# Patient Record
Sex: FEMALE | Race: WHITE | Employment: OTHER | URBAN - NONMETROPOLITAN AREA
[De-identification: names, ages, dates, MRNs, and addresses within clinical notes are randomized per-mention and may not be internally consistent; named-entity substitution may affect disease eponyms.]

---

## 2023-07-29 ENCOUNTER — HOSPITAL ENCOUNTER (EMERGENCY)
Age: 78
Discharge: HOME | End: 2023-07-29
Attending: EMERGENCY MEDICINE
Payer: COMMERCIAL

## 2023-07-29 ENCOUNTER — APPOINTMENT (OUTPATIENT)
Dept: GENERAL RADIOLOGY | Age: 78
End: 2023-07-29
Payer: COMMERCIAL

## 2023-07-29 VITALS
BODY MASS INDEX: 27.99 KG/M2 | HEIGHT: 65 IN | RESPIRATION RATE: 18 BRPM | WEIGHT: 168 LBS | TEMPERATURE: 100.8 F | DIASTOLIC BLOOD PRESSURE: 69 MMHG | SYSTOLIC BLOOD PRESSURE: 126 MMHG | OXYGEN SATURATION: 93 % | HEART RATE: 62 BPM

## 2023-07-29 DIAGNOSIS — U07.1 COVID-19: Primary | ICD-10-CM

## 2023-07-29 DIAGNOSIS — R51.9 ACUTE NONINTRACTABLE HEADACHE, UNSPECIFIED HEADACHE TYPE: ICD-10-CM

## 2023-07-29 LAB — SARS-COV-2 RDRP RESP QL NAA+PROBE: DETECTED

## 2023-07-29 PROCEDURE — 99284 EMERGENCY DEPT VISIT MOD MDM: CPT

## 2023-07-29 PROCEDURE — 6370000000 HC RX 637 (ALT 250 FOR IP): Performed by: EMERGENCY MEDICINE

## 2023-07-29 PROCEDURE — 87635 SARS-COV-2 COVID-19 AMP PRB: CPT

## 2023-07-29 PROCEDURE — 71045 X-RAY EXAM CHEST 1 VIEW: CPT

## 2023-07-29 RX ORDER — ACETAMINOPHEN 325 MG/1
650 TABLET ORAL ONCE
Status: COMPLETED | OUTPATIENT
Start: 2023-07-29 | End: 2023-07-29

## 2023-07-29 RX ORDER — FUROSEMIDE 20 MG/1
20 TABLET ORAL 2 TIMES DAILY
COMMUNITY

## 2023-07-29 RX ORDER — TRAMADOL HYDROCHLORIDE 50 MG/1
50 TABLET ORAL EVERY 6 HOURS PRN
Qty: 12 TABLET | Refills: 0 | Status: SHIPPED | OUTPATIENT
Start: 2023-07-29 | End: 2023-08-01

## 2023-07-29 RX ORDER — TRAMADOL HYDROCHLORIDE 50 MG/1
50 TABLET ORAL ONCE
Status: COMPLETED | OUTPATIENT
Start: 2023-07-29 | End: 2023-07-29

## 2023-07-29 RX ORDER — LOSARTAN POTASSIUM 50 MG/1
50 TABLET ORAL DAILY
COMMUNITY

## 2023-07-29 RX ADMIN — TRAMADOL HYDROCHLORIDE 50 MG: 50 TABLET ORAL at 22:48

## 2023-07-29 RX ADMIN — ACETAMINOPHEN 650 MG: 325 TABLET ORAL at 22:48

## 2023-07-29 ASSESSMENT — PAIN - FUNCTIONAL ASSESSMENT: PAIN_FUNCTIONAL_ASSESSMENT: NONE - DENIES PAIN

## 2023-07-30 ASSESSMENT — ENCOUNTER SYMPTOMS
ABDOMINAL PAIN: 0
SHORTNESS OF BREATH: 1
SINUS PAIN: 1
EYE REDNESS: 1
VOMITING: 0
WHEEZING: 0
DIARRHEA: 1
EYE DISCHARGE: 1
COUGH: 1
SORE THROAT: 1

## 2023-07-30 NOTE — ED TRIAGE NOTES
Patient states she had a cough yesterday. Today her sinuses feel inflamed and hard to take deep breath. Patient also has a fever.

## 2023-07-30 NOTE — ED NOTES
Patient verbalized understanding of discharge instructions, use of pulse ox and medications prescribed. Denies questions or concerns at this time .       Donnie Bhandari RN  07/29/23 [id number]

## 2023-07-30 NOTE — DISCHARGE INSTRUCTIONS
Wear a mask when you are around other people to help prevent spread of infection. Wash your hands or use hand  frequently. Tylenol 650 mg 4 times a day as needed for pain, fever. Tramadol as needed for additional pain control. This can be taken with Tylenol. Paxlovid as prescribed to help fight the COVID-19 virus. The prescription has been sent to [facility], it can be picked up after 10 AM tomorrow. Try to get in 2 doses of the medication tomorrow. Take as directed on the pack. Over-the-counter Mucinex DM, or other cough suppressant and decongestant as needed to help with congestion. You will sleep best with your head elevated, or lying on one side or the other. Some studies suggest that extra vitamin A, B complex, C, D, zinc, and selenium help speed healing and COVID-19 infection. Consider vitamin supplementation. Check your oxygen level every 2 hours while awake, and as needed for increasing shortness of breath. Return to the emergency department for persistent oxygen levels less than 92%, despite coughing and changing position, or any other signs of increasing shortness of breath. Return to the emergency department for any other signs of worsening.

## 2023-07-30 NOTE — ED PROVIDER NOTES
[address]  eMERGENCY dEPARTMENT eNCOUnter             [address] [facility] COMPLAINT    Chief Complaint   Patient presents with    Facial Pain    Cough    Fever       Nurses Notes reviewed and I agree except as noted in the HPI. HPI    Milagros Olson is a 68 y.o. female who presents with family members for evaluation of an illness that started yesterday. She has nasal congestion, fever up to 102, cough, discomfort in her chest with deep breathing and coughing, exertional dyspnea, facial pain, weakness, body aches, generalized malaise. She flew here from [place], [place], where she resides, to attend a wedding. She is supposed to fly back to [place] early this next week. She states that she had COVID-19 6 months ago, \"a light case\". She states that she did take Paxlovid at that time. She has had immunization against COVID-19. REVIEW OF SYSTEMS      Review of Systems   Constitutional:  Positive for chills, fever and malaise/fatigue. HENT:  Positive for congestion, hearing loss (\"Ears feel clogged \"), sinus pain and sore throat. Eyes:  Positive for discharge (Watery) and redness. Respiratory:  Positive for cough and shortness of breath (With exertion). Negative for wheezing. Cardiovascular:  Positive for leg swelling (She states \"I have lymphedema \". Current swelling is no worse than usual.). Negative for chest pain (Increased with cough) and palpitations. Gastrointestinal:  Positive for diarrhea (Mild). Negative for abdominal pain and vomiting. Genitourinary:  Negative for dysuria and flank pain. Musculoskeletal:  Positive for myalgias. Skin:  Negative for rash. Neurological:  Positive for weakness and headaches. Negative for dizziness and focal weakness. All other systems reviewed and are negative.       PAST MEDICAL HISTORY      Hypertension, lymphedema, no history of heart or lung problems    SURGICAL HISTORY      D&C,

## 2023-07-31 ENCOUNTER — CARE COORDINATION (OUTPATIENT)
Dept: CARE COORDINATION | Age: 78
End: 2023-07-31

## 2023-07-31 NOTE — CARE COORDINATION
Attempted to reach patient for covid-19 f/u s/p recent [facility] ED visit for c/o cough, fever, and sinus inflammation. COVID-19 testing was completed and results were positive. Patient was not available at the time of my call and generic voicemail message was left asking patient to please return call to my direct number. Will continue to attempt to f/u with patient in the future.

## 2023-08-01 NOTE — CARE COORDINATION
Patient contacted regarding COVID-19 risk, exposure, diagnosis, and pulse oximeter ordered at discharge. Discussed COVID-19 related testing which was available at this time. Test results were positive. Patient informed of results, if available? Yes and ACM again reviewed during f/u call. Ambulatory Care Manager contacted the patient by telephone to perform post discharge assessment. Call within 2 business days of discharge: Yes. Verified name and  with patient as identifiers. Provided introduction to self, and explanation of the CTN/ACM role, and reason for call due to risk factors for infection and/or exposure to COVID-19. Symptoms reviewed with patient who verbalized the following symptoms: fever  fatigue  pain or aching joints  cough  no new symptoms  no worsening symptoms  Headache and sinus inflammation . Due to no new or worsening symptoms encounter was not routed to provider for escalation. Discussed follow-up appointments. If no appointment was previously scheduled, appointment scheduling offered: N/A - patient declined and does not live locally. [facility] follow up appointment(s): No future appointments. Non-[facility] follow up appointment(s): Denied need    Non-face-to-face services provided:  Obtained and reviewed discharge summary and/or continuity of care documents  Education of patient/family/caregiver/guardian to support self-management-Pulse ox education and use reviewed with patient. Patient educated on what to report and when to follow up and she verbalized understanding. Advance Care Planning:   Does patient have an Advance Directive:  patient declined education. Educated patient about risk for severe COVID-19 due to risk factors according to CDC guidelines. ACM reviewed discharge instructions, medical action plan and red flag symptoms with the patient who verbalized understanding. Discussed COVID vaccination status: N/A.  Discussed exposure protocols and quarantine with CDC